# Patient Record
Sex: MALE | Race: WHITE | Employment: UNEMPLOYED | URBAN - METROPOLITAN AREA
[De-identification: names, ages, dates, MRNs, and addresses within clinical notes are randomized per-mention and may not be internally consistent; named-entity substitution may affect disease eponyms.]

---
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
## 2023-07-22 ENCOUNTER — OFFICE VISIT (OUTPATIENT)
Dept: URGENT CARE | Facility: URGENT CARE | Age: 18
End: 2023-07-22
Payer: MEDICAID

## 2023-07-22 VITALS
DIASTOLIC BLOOD PRESSURE: 79 MMHG | OXYGEN SATURATION: 97 % | RESPIRATION RATE: 20 BRPM | TEMPERATURE: 98.7 F | SYSTOLIC BLOOD PRESSURE: 145 MMHG

## 2023-07-22 DIAGNOSIS — H10.31 ACUTE CONJUNCTIVITIS OF RIGHT EYE, UNSPECIFIED ACUTE CONJUNCTIVITIS TYPE: Primary | ICD-10-CM

## 2023-07-22 PROCEDURE — 99203 OFFICE O/P NEW LOW 30 MIN: CPT | Performed by: PHYSICIAN ASSISTANT

## 2023-07-22 RX ORDER — TOBRAMYCIN AND DEXAMETHASONE 3; 1 MG/ML; MG/ML
1-2 SUSPENSION/ DROPS OPHTHALMIC 3 TIMES DAILY
Qty: 5 ML | Refills: 0 | Status: SHIPPED | OUTPATIENT
Start: 2023-07-22 | End: 2023-07-29

## 2023-07-22 ASSESSMENT — PAIN SCALES - GENERAL: PAINLEVEL: NO PAIN (0)

## 2023-07-22 NOTE — PROGRESS NOTES
Assessment & Plan     Acute conjunctivitis of right eye, unspecified acute conjunctivitis type  Tobradex eyedrops are prescribed.  Good handwashing is advised.  Follow-up if any worsening symptoms.  Patient understands and agrees with the plan.    - tobramycin-dexamethasone (TOBRADEX) 0.3-0.1 % ophthalmic suspension  Dispense: 5 mL; Refill: 0       Return in about 5 days (around 7/27/2023) for Symptoms failing to improve.    Ana Ross PA-C  [facility] [place] Sil[place] is a 17 year old male who presents to clinic today for the following health issues:  Chief Complaint   Patient presents with     Eye Problem     Right eye irritation red, swollen, mattery from mowing, something went in     HPI  Patient is presenting to urgent care today with a complaint of right eye irritation, redness and swelling.  He notes he was mowing the lawn yesterday and  believes he  inadvertently got a piece of grass in the right eye.  He flushed the right eye.  However, since last night right eye feels very irritated.  He denies any eye pain.  Notes last night he had some discharge from the right eye.  Left eye is also starting to get irritated and red today.  No visual changes.      Review of Systems  Constitutional, HEENT, cardiovascular, pulmonary, GI, , musculoskeletal, neuro, skin, endocrine and psych systems are negative, except as otherwise noted.      Objective    BP (!) 145/79 (BP Location: Right arm, Patient Position: Sitting, Cuff Size: Adult Regular)   Temp 98.7  F (37.1  C) (Oral)   Resp 20   SpO2 97%   Physical Exam   GENERAL: healthy, alert and no distress  EYES: PERRL, EOM are normal, conjunctivae and sclerae injected right eye, there is scant amount of thick drainage noted in the medial canthus, left eye there is mild irritation noted medially. No foreign body appreciated. Right upper and lower eyelids slightly swollen, no tenderness with palpation over the orbital rim.

## 2024-12-22 ENCOUNTER — HOSPITAL ENCOUNTER (EMERGENCY)
Facility: CLINIC | Age: 19
Discharge: LEFT BEFORE DISCHARGE | End: 2024-12-22
Admitting: EMERGENCY MEDICINE
Payer: COMMERCIAL

## 2024-12-22 VITALS
HEART RATE: 62 BPM | OXYGEN SATURATION: 98 % | DIASTOLIC BLOOD PRESSURE: 92 MMHG | RESPIRATION RATE: 16 BRPM | TEMPERATURE: 97.9 F | HEIGHT: 73 IN | WEIGHT: 218.92 LBS | BODY MASS INDEX: 29.01 KG/M2 | SYSTOLIC BLOOD PRESSURE: 154 MMHG

## 2024-12-22 PROCEDURE — 99281 EMR DPT VST MAYX REQ PHY/QHP: CPT

## 2024-12-22 ASSESSMENT — ACTIVITIES OF DAILY LIVING (ADL)
ADLS_ACUITY_SCORE: 41

## 2024-12-22 ASSESSMENT — COLUMBIA-SUICIDE SEVERITY RATING SCALE - C-SSRS
1. IN THE PAST MONTH, HAVE YOU WISHED YOU WERE DEAD OR WISHED YOU COULD GO TO SLEEP AND NOT WAKE UP?: NO
2. HAVE YOU ACTUALLY HAD ANY THOUGHTS OF KILLING YOURSELF IN THE PAST MONTH?: NO
6. HAVE YOU EVER DONE ANYTHING, STARTED TO DO ANYTHING, OR PREPARED TO DO ANYTHING TO END YOUR LIFE?: NO

## 2024-12-23 NOTE — ED TRIAGE NOTES
Fell off a ripstick and cut left pinky on Friday night. Bleeding wont stop and pt leaves for [place] tomorrow.